# Patient Record
Sex: MALE | Race: WHITE
[De-identification: names, ages, dates, MRNs, and addresses within clinical notes are randomized per-mention and may not be internally consistent; named-entity substitution may affect disease eponyms.]

---

## 2017-01-10 ENCOUNTER — HOSPITAL ENCOUNTER (OUTPATIENT)
Dept: HOSPITAL 19 - MKS.ESL.PT | Age: 67
LOS: 42 days | Discharge: HOME | End: 2017-02-21
Payer: MEDICARE

## 2017-01-10 DIAGNOSIS — G62.89: Primary | ICD-10-CM

## 2017-01-31 ENCOUNTER — HOSPITAL ENCOUNTER (OUTPATIENT)
Dept: HOSPITAL 19 - MKS.ESL.PT | Age: 67
LOS: 5 days | Discharge: HOME | End: 2017-02-05
Payer: MEDICARE

## 2017-01-31 DIAGNOSIS — G62.89: Primary | ICD-10-CM

## 2018-02-23 ENCOUNTER — HOSPITAL ENCOUNTER (OUTPATIENT)
Dept: HOSPITAL 6 - LAB | Age: 68
End: 2018-02-23
Attending: INTERNAL MEDICINE
Payer: MEDICARE

## 2018-02-23 DIAGNOSIS — C67.9: ICD-10-CM

## 2018-02-23 DIAGNOSIS — G47.33: ICD-10-CM

## 2018-02-23 DIAGNOSIS — I48.0: Primary | ICD-10-CM

## 2018-02-23 DIAGNOSIS — E78.2: ICD-10-CM

## 2018-02-23 DIAGNOSIS — Z12.11: ICD-10-CM

## 2018-02-23 DIAGNOSIS — M1A.9XX0: ICD-10-CM

## 2018-02-23 DIAGNOSIS — N52.03: ICD-10-CM

## 2018-02-23 LAB
ALBUMIN SERPL-MCNC: 4.3 G/DL (ref 3.5–5)
ALT SERPL-CCNC: 30 U/L (ref 21–72)
AST SERPL-CCNC: 33 U/L (ref 17–59)
BASOPHILS # BLD: 0 10*3/UL (ref 0.02–0.1)
BILIRUB SERPL-MCNC: 0.8 MG/DL (ref 0.2–1.3)
BUN/CREAT SERPL: 15.7 (ref 6–26)
CALCIUM SERPL-MCNC: 9.8 MG/DL (ref 8.4–10.2)
CO2 SERPL-SCNC: 28 MMOL/L (ref 22–30)
EOSINOPHIL # BLD: 0.1 10*3/UL (ref 0.04–0.4)
EOSINOPHIL NFR BLD: 0.7 % (ref 0–4)
ERYTHROCYTE [DISTWIDTH] IN BLOOD BY AUTOMATED COUNT: 14 % (ref 11.5–14.5)
ERYTHROCYTE [SEDIMENTATION RATE] IN BLOOD: 6 MM/HR (ref 0–20)
GLUCOSE SERPL-MCNC: 103 MG/DL (ref 75–110)
HCT VFR BLD AUTO: 47.5 % (ref 42–52)
HGB BLD-MCNC: 16.6 G/DL (ref 13.5–18)
LYMPHOCYTES # BLD: 0.9 10*3/UL (ref 1.5–4)
MCH RBC QN AUTO: 30 PG (ref 27–31)
MCHC RBC AUTO-ENTMCNC: 35 G/DL (ref 33–37)
MCV RBC AUTO: 85 FL (ref 78–100)
MONOCYTES # BLD: 0.8 10*3/UL (ref 0.2–0.8)
NEUTROPHILS # BLD: 6.4 10*3/UL (ref 1.4–6.5)
PLATELET # BLD AUTO: 166 K/MM3 (ref 130–400)
PMV BLD AUTO: 10.9 FL (ref 7.4–10.4)
POTASSIUM SERPL-SCNC: 4.1 MMOL/L (ref 3.6–5)
PROT SERPL-MCNC: 7.7 G/DL (ref 6.3–8.2)
RBC # BLD AUTO: 5.56 M/MM3 (ref 4.2–5.6)
SODIUM SERPL-SCNC: 135 MMOL/L (ref 137–145)
WBC # BLD AUTO: 8.2 K/MM3 (ref 4.8–10.8)

## 2018-05-29 ENCOUNTER — HOSPITAL ENCOUNTER (OUTPATIENT)
Dept: HOSPITAL 6 - AMSURD | Age: 68
End: 2018-05-29
Attending: INTERNAL MEDICINE
Payer: MEDICARE

## 2018-05-29 ENCOUNTER — HOSPITAL ENCOUNTER (OUTPATIENT)
Dept: HOSPITAL 19 - ZLAB.WCH | Age: 68
End: 2018-05-29

## 2018-05-29 VITALS
DIASTOLIC BLOOD PRESSURE: 94 MMHG | SYSTOLIC BLOOD PRESSURE: 114 MMHG | DIASTOLIC BLOOD PRESSURE: 94 MMHG | DIASTOLIC BLOOD PRESSURE: 94 MMHG | SYSTOLIC BLOOD PRESSURE: 114 MMHG | DIASTOLIC BLOOD PRESSURE: 94 MMHG | SYSTOLIC BLOOD PRESSURE: 114 MMHG | DIASTOLIC BLOOD PRESSURE: 94 MMHG | DIASTOLIC BLOOD PRESSURE: 94 MMHG | DIASTOLIC BLOOD PRESSURE: 94 MMHG | SYSTOLIC BLOOD PRESSURE: 114 MMHG | SYSTOLIC BLOOD PRESSURE: 114 MMHG | SYSTOLIC BLOOD PRESSURE: 114 MMHG | SYSTOLIC BLOOD PRESSURE: 114 MMHG

## 2018-05-29 VITALS — BODY MASS INDEX: 31.85 KG/M2 | HEIGHT: 70.98 IN | WEIGHT: 227.52 LBS

## 2018-05-29 DIAGNOSIS — Z01.818: Primary | ICD-10-CM

## 2018-05-29 DIAGNOSIS — Z95.2: ICD-10-CM

## 2018-05-29 DIAGNOSIS — Z98.890: ICD-10-CM

## 2018-05-29 DIAGNOSIS — M17.0: ICD-10-CM

## 2018-05-29 DIAGNOSIS — Z01.89: Primary | ICD-10-CM

## 2018-05-29 LAB
ALBUMIN SERPL-MCNC: 4.1 G/DL (ref 3.5–5)
ALT SERPL-CCNC: 35 U/L (ref 21–72)
APPEARANCE UR: CLEAR
AST SERPL-CCNC: 51 U/L (ref 17–59)
BILIRUB SERPL-MCNC: 0.8 MG/DL (ref 0.2–1.3)
BILIRUB UR QL STRIP.AUTO: NEGATIVE
BUN/CREAT SERPL: 19 (ref 6–26)
CALCIUM SERPL-MCNC: 9.6 MG/DL (ref 8.4–10.2)
CO2 SERPL-SCNC: 30 MMOL/L (ref 22–30)
COLOR UR AUTO: YELLOW
ERYTHROCYTE [DISTWIDTH] IN BLOOD BY AUTOMATED COUNT: 14.8 % (ref 11.5–14.5)
GLUCOSE SERPL-MCNC: 105 MG/DL (ref 75–110)
GLUCOSE UR QL STRIP.AUTO: NEGATIVE MG/DL
HCT VFR BLD AUTO: 44.3 % (ref 42–52)
HGB BLD-MCNC: 15.4 G/DL (ref 13.5–18)
KETONES UR QL STRIP.AUTO: NEGATIVE
LEUKOCYTE ESTERASE UR QL STRIP: NEGATIVE
LYMPHOCYTES NFR BLD MANUAL: 14 % (ref 20–51)
MCH RBC QN AUTO: 30 PG (ref 27–31)
MCHC RBC AUTO-ENTMCNC: 35 G/DL (ref 33–37)
MCV RBC AUTO: 86 FL (ref 78–100)
MONOCYTES NFR BLD: 5 % (ref 3–10)
NEUTS SEG NFR BLD MANUAL: 80 % (ref 42–75)
NITRITE UR QL STRIP: NEGATIVE
PH UR STRIP.AUTO: 6.5 [PH] (ref 5–8)
PLATELET # BLD AUTO: 145 K/MM3 (ref 130–400)
PMV BLD AUTO: 10.9 FL (ref 7.4–10.4)
POTASSIUM SERPL-SCNC: 4.5 MMOL/L (ref 3.6–5)
PROT ?TM UR-MCNC: NEGATIVE MG/DL
PROT SERPL-MCNC: 7.5 G/DL (ref 6.3–8.2)
PROTHROMBIN TIME: 22.9 SECONDS (ref 9–12)
RBC # BLD AUTO: 5.16 M/MM3 (ref 4.2–5.6)
RBC UR QL AUTO: NEGATIVE
SODIUM SERPL-SCNC: 134 MMOL/L (ref 137–145)
SP GR UR STRIP.AUTO: 1.01 (ref 1–1.03)
UROBILINOGEN UR-MCNC: NORMAL MG/DL
WBC # BLD AUTO: 7.9 K/MM3 (ref 4.8–10.8)
WBC #/AREA URNS HPF: (no result) /HPF (ref 0–3)

## 2018-06-18 VITALS — SYSTOLIC BLOOD PRESSURE: 188 MMHG | HEART RATE: 49 BPM | DIASTOLIC BLOOD PRESSURE: 72 MMHG | TEMPERATURE: 97 F

## 2018-06-19 ENCOUNTER — HOSPITAL ENCOUNTER (OUTPATIENT)
Dept: HOSPITAL 19 - COL.ER | Age: 68
Setting detail: OBSERVATION
LOS: 1 days | Discharge: HOME | End: 2018-06-20
Attending: SURGERY | Admitting: SURGERY
Payer: MEDICARE

## 2018-06-19 VITALS — HEART RATE: 50 BPM | DIASTOLIC BLOOD PRESSURE: 74 MMHG | SYSTOLIC BLOOD PRESSURE: 188 MMHG

## 2018-06-19 VITALS — HEIGHT: 72 IN | BODY MASS INDEX: 30.94 KG/M2 | WEIGHT: 228.4 LBS

## 2018-06-19 VITALS — SYSTOLIC BLOOD PRESSURE: 181 MMHG | HEART RATE: 52 BPM | DIASTOLIC BLOOD PRESSURE: 71 MMHG

## 2018-06-19 VITALS — HEART RATE: 50 BPM | SYSTOLIC BLOOD PRESSURE: 177 MMHG | DIASTOLIC BLOOD PRESSURE: 69 MMHG

## 2018-06-19 VITALS — DIASTOLIC BLOOD PRESSURE: 84 MMHG | HEART RATE: 50 BPM | SYSTOLIC BLOOD PRESSURE: 205 MMHG

## 2018-06-19 VITALS — DIASTOLIC BLOOD PRESSURE: 64 MMHG | HEART RATE: 49 BPM | SYSTOLIC BLOOD PRESSURE: 157 MMHG

## 2018-06-19 DIAGNOSIS — I10: ICD-10-CM

## 2018-06-19 DIAGNOSIS — Z95.2: ICD-10-CM

## 2018-06-19 DIAGNOSIS — Z95.810: ICD-10-CM

## 2018-06-19 DIAGNOSIS — E78.5: ICD-10-CM

## 2018-06-19 DIAGNOSIS — Z90.5: ICD-10-CM

## 2018-06-19 DIAGNOSIS — T18.5XXA: Primary | ICD-10-CM

## 2018-06-19 DIAGNOSIS — Z88.6: ICD-10-CM

## 2018-06-19 DIAGNOSIS — Z87.891: ICD-10-CM

## 2018-06-19 LAB
ALBUMIN SERPL-MCNC: 4 GM/DL (ref 3.5–5)
ALP SERPL-CCNC: 64 U/L (ref 50–136)
ALT SERPL-CCNC: 35 U/L (ref 21–72)
ANION GAP SERPL CALC-SCNC: 11 MMOL/L (ref 7–16)
AST SERPL-CCNC: 37 U/L (ref 15–37)
BASOPHILS # BLD: 0 10*3/UL (ref 0–0.2)
BASOPHILS NFR BLD AUTO: 0.3 % (ref 0–2)
BILIRUB SERPL-MCNC: 1 MG/DL (ref 0–1)
BUN SERPL-MCNC: 23 MG/DL (ref 9–20)
CALCIUM SERPL-MCNC: 9.9 MG/DL (ref 8.4–10.2)
CHLORIDE SERPL-SCNC: 96 MMOL/L (ref 98–107)
CO2 SERPL-SCNC: 22 MMOL/L (ref 22–30)
CREAT SERPL-SCNC: 0.98 MG/DL (ref 0.66–1.25)
EOSINOPHIL # BLD: 0 10*3/UL (ref 0–0.7)
EOSINOPHIL NFR BLD: 0 % (ref 0–4)
ERYTHROCYTE [DISTWIDTH] IN BLOOD BY AUTOMATED COUNT: 14.9 % (ref 11.5–14.5)
GLUCOSE SERPL-MCNC: 116 MG/DL (ref 74–106)
GRANULOCYTES # BLD AUTO: 90 % (ref 42.2–75.2)
HCT VFR BLD AUTO: 42.3 % (ref 42–52)
HGB BLD-MCNC: 15.1 G/DL (ref 13.5–18)
INR BLD: 2.5 (ref 0.8–3)
LYMPHOCYTES # BLD: 0.5 10*3/UL (ref 1.2–3.4)
LYMPHOCYTES NFR BLD: 4.3 % (ref 20–51)
MCH RBC QN AUTO: 31 PG (ref 27–31)
MCHC RBC AUTO-ENTMCNC: 36 G/DL (ref 33–37)
MCV RBC AUTO: 86 FL (ref 80–100)
MONOCYTES # BLD: 0.5 10*3/UL (ref 0.1–0.6)
MONOCYTES NFR BLD AUTO: 4.8 % (ref 1.7–9.3)
NEUTROPHILS # BLD: 10.2 10*3/UL (ref 1.4–6.5)
PLATELET # BLD AUTO: 165 K/MM3 (ref 130–400)
PMV BLD AUTO: 10.5 FL (ref 7.4–10.4)
POTASSIUM SERPL-SCNC: 4 MMOL/L (ref 3.4–5)
PROT SERPL-MCNC: 7.5 GM/DL (ref 6.4–8.2)
PROTHROMBIN TIME: 28.4 SECONDS (ref 9.7–12.8)
RBC # BLD AUTO: 4.94 M/MM3 (ref 4.2–5.6)
SODIUM SERPL-SCNC: 129 MMOL/L (ref 137–145)

## 2018-06-19 PROCEDURE — G0378 HOSPITAL OBSERVATION PER HR: HCPCS

## 2018-06-19 PROCEDURE — A4314 CATH W/DRAINAGE 2-WAY LATEX: HCPCS

## 2018-06-20 VITALS — TEMPERATURE: 98.4 F | DIASTOLIC BLOOD PRESSURE: 67 MMHG | HEART RATE: 54 BPM | SYSTOLIC BLOOD PRESSURE: 168 MMHG

## 2018-06-20 VITALS — HEART RATE: 79 BPM | SYSTOLIC BLOOD PRESSURE: 133 MMHG | DIASTOLIC BLOOD PRESSURE: 62 MMHG

## 2018-06-20 VITALS — HEART RATE: 65 BPM | DIASTOLIC BLOOD PRESSURE: 60 MMHG | SYSTOLIC BLOOD PRESSURE: 153 MMHG | TEMPERATURE: 98 F

## 2018-06-20 VITALS — HEART RATE: 55 BPM | SYSTOLIC BLOOD PRESSURE: 172 MMHG | DIASTOLIC BLOOD PRESSURE: 73 MMHG | TEMPERATURE: 97.3 F

## 2018-06-20 VITALS — SYSTOLIC BLOOD PRESSURE: 181 MMHG | DIASTOLIC BLOOD PRESSURE: 70 MMHG | TEMPERATURE: 98.4 F | HEART RATE: 52 BPM

## 2018-06-20 VITALS — DIASTOLIC BLOOD PRESSURE: 62 MMHG | SYSTOLIC BLOOD PRESSURE: 138 MMHG | HEART RATE: 49 BPM | TEMPERATURE: 97 F

## 2018-06-20 VITALS — HEART RATE: 49 BPM | DIASTOLIC BLOOD PRESSURE: 61 MMHG | TEMPERATURE: 97 F | SYSTOLIC BLOOD PRESSURE: 144 MMHG

## 2018-06-20 VITALS — SYSTOLIC BLOOD PRESSURE: 151 MMHG | TEMPERATURE: 97.4 F | HEART RATE: 50 BPM | DIASTOLIC BLOOD PRESSURE: 64 MMHG

## 2018-06-20 LAB
ALBUMIN SERPL-MCNC: 3.8 GM/DL (ref 3.5–5)
ANION GAP SERPL CALC-SCNC: 9 MMOL/L (ref 7–16)
BASOPHILS # BLD: 0 10*3/UL (ref 0–0.2)
BASOPHILS NFR BLD AUTO: 0.2 % (ref 0–2)
BUN SERPL-MCNC: 19 MG/DL (ref 9–20)
CALCIUM SERPL-MCNC: 9.3 MG/DL (ref 8.4–10.2)
CHLORIDE SERPL-SCNC: 96 MMOL/L (ref 98–107)
CO2 SERPL-SCNC: 26 MMOL/L (ref 22–30)
CREAT SERPL-SCNC: 0.89 MG/DL (ref 0.66–1.25)
EOSINOPHIL # BLD: 0 10*3/UL (ref 0–0.7)
EOSINOPHIL NFR BLD: 0.3 % (ref 0–4)
ERYTHROCYTE [DISTWIDTH] IN BLOOD BY AUTOMATED COUNT: 15.6 % (ref 11.5–14.5)
GLUCOSE SERPL-MCNC: 90 MG/DL (ref 74–106)
GRANULOCYTES # BLD AUTO: 86.2 % (ref 42.2–75.2)
HCT VFR BLD AUTO: 43.7 % (ref 42–52)
HGB BLD-MCNC: 15.3 G/DL (ref 13.5–18)
INR BLD: 2.1 (ref 0.8–3)
LYMPHOCYTES # BLD: 0.5 10*3/UL (ref 1.2–3.4)
LYMPHOCYTES NFR BLD: 5.3 % (ref 20–51)
MCH RBC QN AUTO: 30 PG (ref 27–31)
MCHC RBC AUTO-ENTMCNC: 35 G/DL (ref 33–37)
MCV RBC AUTO: 86 FL (ref 80–100)
MONOCYTES # BLD: 0.7 10*3/UL (ref 0.1–0.6)
MONOCYTES NFR BLD AUTO: 7.3 % (ref 1.7–9.3)
NEUTROPHILS # BLD: 8.5 10*3/UL (ref 1.4–6.5)
PHOSPHATE SERPL-MCNC: 3.7 MG/DL (ref 2.5–4.5)
PLATELET # BLD AUTO: 157 K/MM3 (ref 130–400)
PMV BLD AUTO: 11.3 FL (ref 7.4–10.4)
POTASSIUM SERPL-SCNC: 3.9 MMOL/L (ref 3.4–5)
PROTHROMBIN TIME: 24.2 SECONDS (ref 9.7–12.8)
RBC # BLD AUTO: 5.06 M/MM3 (ref 4.2–5.6)
SODIUM SERPL-SCNC: 131 MMOL/L (ref 137–145)

## 2019-01-08 ENCOUNTER — HOSPITAL ENCOUNTER (OUTPATIENT)
Dept: HOSPITAL 6 - LAB | Age: 69
End: 2019-01-08
Attending: INTERNAL MEDICINE
Payer: MEDICARE

## 2019-01-08 ENCOUNTER — HOSPITAL ENCOUNTER (OUTPATIENT)
Dept: HOSPITAL 19 - ZLAB.WCH | Age: 69
End: 2019-01-08

## 2019-01-08 DIAGNOSIS — E29.1: ICD-10-CM

## 2019-01-08 DIAGNOSIS — E78.2: ICD-10-CM

## 2019-01-08 DIAGNOSIS — Z01.89: Primary | ICD-10-CM

## 2019-01-08 DIAGNOSIS — M1A.9XX0: ICD-10-CM

## 2019-01-08 DIAGNOSIS — I48.92: ICD-10-CM

## 2019-01-08 DIAGNOSIS — I48.91: Primary | ICD-10-CM

## 2019-01-08 DIAGNOSIS — I10: ICD-10-CM

## 2019-01-08 DIAGNOSIS — G62.9: ICD-10-CM

## 2019-01-08 LAB
ALBUMIN SERPL-MCNC: 4.6 G/DL (ref 3.5–5)
ALT SERPL-CCNC: 23 U/L (ref 21–72)
AST SERPL-CCNC: 36 U/L (ref 17–59)
BILIRUB SERPL-MCNC: 1 MG/DL (ref 0.2–1.3)
CALCIUM SERPL-MCNC: 10.4 MG/DL (ref 8.4–10.2)
CO2 SERPL-SCNC: 30 MMOL/L (ref 22–30)
ERYTHROCYTE [DISTWIDTH] IN BLOOD BY AUTOMATED COUNT: 14.4 % (ref 11.5–14.5)
ERYTHROCYTE [SEDIMENTATION RATE] IN BLOOD: 7 MM/HR (ref 0–20)
GLUCOSE SERPL-MCNC: 89 MG/DL (ref 75–110)
HCT VFR BLD AUTO: 46.6 % (ref 42–52)
HGB BLD-MCNC: 16 G/DL (ref 13.5–18)
LYMPHOCYTES NFR BLD MANUAL: 11 % (ref 20–51)
MCH RBC QN AUTO: 30 PG (ref 27–31)
MCHC RBC AUTO-ENTMCNC: 34 G/DL (ref 33–37)
MCV RBC AUTO: 88 FL (ref 78–100)
MONOCYTES NFR BLD: 12 % (ref 3–10)
NEUTS SEG NFR BLD MANUAL: 76 % (ref 42–75)
PLATELET # BLD AUTO: 145 K/MM3 (ref 130–400)
PMV BLD AUTO: 10.5 FL (ref 7.4–10.4)
POTASSIUM SERPL-SCNC: 5 MMOL/L (ref 3.6–5)
PROT SERPL-MCNC: 8 G/DL (ref 6.3–8.2)
RBC # BLD AUTO: 5.27 M/MM3 (ref 4.2–5.6)
SODIUM SERPL-SCNC: 133 MMOL/L (ref 137–145)
WBC # BLD AUTO: 7.3 K/MM3 (ref 4.8–10.8)

## 2019-01-09 LAB — TESTOST SERPL-MCNC: 501 NG/DL (ref 221–716)

## 2019-06-04 ENCOUNTER — HOSPITAL ENCOUNTER (OUTPATIENT)
Dept: HOSPITAL 6 - LAB | Age: 69
End: 2019-06-04
Attending: INTERNAL MEDICINE
Payer: MEDICARE

## 2019-06-04 DIAGNOSIS — G62.9: ICD-10-CM

## 2019-06-04 DIAGNOSIS — I48.91: ICD-10-CM

## 2019-06-04 DIAGNOSIS — E78.2: ICD-10-CM

## 2019-06-04 DIAGNOSIS — Z95.2: ICD-10-CM

## 2019-06-04 DIAGNOSIS — I10: Primary | ICD-10-CM

## 2019-06-04 LAB
ALBUMIN SERPL-MCNC: 4.2 G/DL (ref 3.4–4.8)
ALT SERPL-CCNC: 19 U/L (ref 0–55)
AST SERPL-CCNC: 29 U/L (ref 5–34)
BASOPHILS # BLD: 0 10*3/UL (ref 0.02–0.1)
BILIRUB SERPL-MCNC: 0.8 MG/DL (ref 0.2–1.2)
CALCIUM SERPL-MCNC: 10.7 MG/DL (ref 8.3–10.5)
CO2 SERPL-SCNC: 24 MMOL/L (ref 23–31)
EOSINOPHIL # BLD: 0.1 10*3/UL (ref 0.04–0.4)
EOSINOPHIL NFR BLD: 1.2 % (ref 0–4)
ERYTHROCYTE [DISTWIDTH] IN BLOOD BY AUTOMATED COUNT: 15.2 % (ref 11.5–14.5)
ERYTHROCYTE [SEDIMENTATION RATE] IN BLOOD: 29 MM/HR (ref 0–20)
GLUCOSE SERPL-MCNC: 99 MG/DL (ref 75–110)
HCT VFR BLD AUTO: 45.7 % (ref 42–52)
HGB BLD-MCNC: 15.2 G/DL (ref 13.5–18)
LYMPHOCYTES # BLD: 1 10*3/UL (ref 1.5–4)
MCH RBC QN AUTO: 30 PG (ref 27–31)
MCHC RBC AUTO-ENTMCNC: 33 G/DL (ref 33–37)
MCV RBC AUTO: 90 FL (ref 78–100)
MONOCYTES # BLD: 0.8 10*3/UL (ref 0.2–0.8)
NEUTROPHILS # BLD: 6.3 10*3/UL (ref 1.4–6.5)
PLATELET # BLD AUTO: 161 K/MM3 (ref 130–400)
PMV BLD AUTO: 11.1 FL (ref 7.4–10.4)
POTASSIUM SERPL-SCNC: 4.9 MMOL/L (ref 3.5–5.1)
PROT SERPL-MCNC: 7.6 G/DL (ref 6.2–8.1)
PROTHROMBIN TIME: 21.7 SECONDS (ref 9–12)
RBC # BLD AUTO: 5.08 M/MM3 (ref 4.2–5.6)
SODIUM SERPL-SCNC: 135 MMOL/L (ref 136–145)
WBC # BLD AUTO: 8.3 K/MM3 (ref 4.8–10.8)

## 2019-06-08 LAB — TESTOST SERPL-MCNC: 500 NG/DL (ref 221–716)

## 2019-12-03 ENCOUNTER — HOSPITAL ENCOUNTER (OUTPATIENT)
Dept: HOSPITAL 6 - LAB | Age: 69
End: 2019-12-03
Attending: INTERNAL MEDICINE
Payer: MEDICARE

## 2019-12-03 DIAGNOSIS — I48.0: ICD-10-CM

## 2019-12-03 DIAGNOSIS — E78.2: ICD-10-CM

## 2019-12-03 DIAGNOSIS — I10: Primary | ICD-10-CM

## 2019-12-03 DIAGNOSIS — Z95.2: ICD-10-CM

## 2019-12-03 DIAGNOSIS — M1A.9XX0: ICD-10-CM

## 2019-12-03 DIAGNOSIS — G62.89: ICD-10-CM

## 2019-12-03 LAB
ALBUMIN SERPL-MCNC: 4.1 G/DL (ref 3.4–4.8)
ALT SERPL-CCNC: 18 U/L (ref 0–55)
AST SERPL-CCNC: 24 U/L (ref 5–34)
BILIRUB SERPL-MCNC: 0.8 MG/DL (ref 0.2–1.2)
CALCIUM SERPL-MCNC: 10.4 MG/DL (ref 8.3–10.5)
CO2 SERPL-SCNC: 25 MMOL/L (ref 23–31)
ERYTHROCYTE [DISTWIDTH] IN BLOOD BY AUTOMATED COUNT: 14.3 % (ref 11.5–14.5)
ERYTHROCYTE [SEDIMENTATION RATE] IN BLOOD: 16 MM/HR (ref 0–20)
GLUCOSE SERPL-MCNC: 97 MG/DL (ref 75–110)
HCT VFR BLD AUTO: 45.4 % (ref 42–52)
HGB BLD-MCNC: 14.6 G/DL (ref 13.5–18)
LYMPHOCYTES NFR BLD MANUAL: 8 % (ref 20–51)
MAGNESIUM SERPL-MCNC: 1.87 MG/DL (ref 1.6–2.6)
MCH RBC QN AUTO: 29 PG (ref 27–31)
MCHC RBC AUTO-ENTMCNC: 32 G/DL (ref 33–37)
MCV RBC AUTO: 90 FL (ref 78–100)
MONOCYTES NFR BLD: 8 % (ref 3–10)
NEUTS SEG NFR BLD MANUAL: 83 % (ref 42–75)
NRBC BLD AUTO-RTO: 1 % (ref 0–6)
PLATELET # BLD AUTO: 172 K/MM3 (ref 130–400)
PMV BLD AUTO: 11.1 FL (ref 7.4–10.4)
POTASSIUM SERPL-SCNC: 5.2 MMOL/L (ref 3.5–5.1)
PROT SERPL-MCNC: 7.4 G/DL (ref 6.2–8.1)
PROTHROMBIN TIME: 28.3 SECONDS (ref 9–12)
RBC # BLD AUTO: 5.07 M/MM3 (ref 4.2–5.6)
SODIUM SERPL-SCNC: 136 MMOL/L (ref 136–145)
WBC # BLD AUTO: 8.8 K/MM3 (ref 4.8–10.8)

## 2020-05-17 ENCOUNTER — HOSPITAL ENCOUNTER (INPATIENT)
Dept: HOSPITAL 19 - COL.ER | Age: 70
LOS: 3 days | Discharge: HOME | DRG: 200 | End: 2020-05-20
Attending: SURGERY | Admitting: SURGERY
Payer: MEDICARE

## 2020-05-17 VITALS — SYSTOLIC BLOOD PRESSURE: 135 MMHG | DIASTOLIC BLOOD PRESSURE: 63 MMHG | HEART RATE: 50 BPM

## 2020-05-17 VITALS — DIASTOLIC BLOOD PRESSURE: 63 MMHG | HEART RATE: 50 BPM | TEMPERATURE: 97.8 F | SYSTOLIC BLOOD PRESSURE: 135 MMHG

## 2020-05-17 VITALS — HEART RATE: 51 BPM | SYSTOLIC BLOOD PRESSURE: 149 MMHG | TEMPERATURE: 97.4 F | DIASTOLIC BLOOD PRESSURE: 62 MMHG

## 2020-05-17 VITALS — HEART RATE: 51 BPM | DIASTOLIC BLOOD PRESSURE: 62 MMHG | SYSTOLIC BLOOD PRESSURE: 149 MMHG

## 2020-05-17 VITALS — TEMPERATURE: 97.2 F | SYSTOLIC BLOOD PRESSURE: 147 MMHG | DIASTOLIC BLOOD PRESSURE: 59 MMHG | HEART RATE: 50 BPM

## 2020-05-17 VITALS — HEIGHT: 72.99 IN | BODY MASS INDEX: 30.56 KG/M2 | WEIGHT: 230.6 LBS

## 2020-05-17 DIAGNOSIS — I10: ICD-10-CM

## 2020-05-17 DIAGNOSIS — S27.0XXA: Primary | ICD-10-CM

## 2020-05-17 DIAGNOSIS — Z95.2: ICD-10-CM

## 2020-05-17 DIAGNOSIS — Z90.5: ICD-10-CM

## 2020-05-17 DIAGNOSIS — Z95.810: ICD-10-CM
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
## 2020-05-17 DIAGNOSIS — Z79.01: ICD-10-CM

## 2020-05-17 DIAGNOSIS — G62.9: ICD-10-CM

## 2020-05-17 DIAGNOSIS — S22.31XA: ICD-10-CM

## 2020-05-17 DIAGNOSIS — E78.5: ICD-10-CM
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
## 2020-05-17 DIAGNOSIS — Z87.891: ICD-10-CM

## 2020-05-17 LAB
ALBUMIN SERPL-MCNC: 4.7 GM/DL (ref 3.5–5)
ALP SERPL-CCNC: 74 U/L (ref 50–136)
ALT SERPL-CCNC: 21 U/L (ref 4–49)
ANION GAP SERPL CALC-SCNC: 8 MMOL/L (ref 7–16)
APTT PPP: 48.8 SECONDS (ref 26–37)
AST SERPL-CCNC: 37 U/L (ref 15–37)
BASOPHILS # BLD: 0 10*3/UL (ref 0–0.2)
BASOPHILS NFR BLD AUTO: 0.4 % (ref 0–2)
BILIRUB SERPL-MCNC: 0.9 MG/DL (ref 0–1)
BUN SERPL-MCNC: 26 MG/DL (ref 9–20)
CALCIUM SERPL-MCNC: 10.1 MG/DL (ref 8.4–10.2)
CHLORIDE SERPL-SCNC: 102 MMOL/L (ref 98–107)
CO2 SERPL-SCNC: 25 MMOL/L (ref 22–30)
CREAT SERPL-SCNC: 1.74 UMOL/L (ref 0.66–1.25)
EOSINOPHIL # BLD: 0.1 10*3/UL (ref 0–0.7)
EOSINOPHIL NFR BLD: 1.1 % (ref 0–4)
ERYTHROCYTE [DISTWIDTH] IN BLOOD BY AUTOMATED COUNT: 14.4 % (ref 11.5–14.5)
GLUCOSE SERPL-MCNC: 119 MG/DL (ref 74–106)
GRANULOCYTES # BLD AUTO: 80.6 % (ref 42.2–75.2)
HCT VFR BLD AUTO: 47.4 % (ref 42–52)
HCT VFR BLD AUTO: 47.9 % (ref 42–52)
HGB BLD-MCNC: 16 G/DL (ref 13.5–18)
HGB BLD-MCNC: 16.1 G/DL (ref 13.5–18)
INR BLD: 2.4 (ref 0.8–3)
LYMPHOCYTES # BLD: 1 10*3/UL (ref 1.2–3.4)
LYMPHOCYTES NFR BLD: 10.3 % (ref 20–51)
MCH RBC QN AUTO: 29 PG (ref 27–31)
MCHC RBC AUTO-ENTMCNC: 34 G/DL (ref 33–37)
MCV RBC AUTO: 88 FL (ref 80–100)
MONOCYTES # BLD: 0.6 10*3/UL (ref 0.1–0.6)
MONOCYTES NFR BLD AUTO: 6.4 % (ref 1.7–9.3)
NEUTROPHILS # BLD: 8 10*3/UL (ref 1.4–6.5)
PLATELET # BLD AUTO: 157 K/MM3 (ref 130–400)
PMV BLD AUTO: 11.3 FL (ref 7.4–10.4)
POTASSIUM SERPL-SCNC: 4.9 MMOL/L (ref 3.4–5)
PROT SERPL-MCNC: 8.4 GM/DL (ref 6.4–8.2)
PROTHROMBIN TIME: 27.3 SECONDS (ref 9.7–12.8)
RBC # BLD AUTO: 5.47 M/MM3 (ref 4.2–5.6)
SODIUM SERPL-SCNC: 135 MMOL/L (ref 137–145)

## 2020-05-17 PROCEDURE — A9284 NON-ELECTRONIC SPIROMETER: HCPCS

## 2020-05-17 PROCEDURE — 0W9930Z DRAINAGE OF RIGHT PLEURAL CAVITY WITH DRAINAGE DEVICE, PERCUTANEOUS APPROACH: ICD-10-PCS | Performed by: SURGERY

## 2020-05-17 NOTE — NUR
Report received. Assumed care for night shift. Assessment complete. A&Ox3.
Chest tube-right chest-to 20cm suction. Small amount of reddish fluid. IV to
left AC with [id number]-dilaudid PCA for pain. Rating pain 1/10 to right
shoulder/ribs-states dilaudid is controlling pain well. O2@4L/NC. Denies
questions/concerns. Call light in reach. Will monitor.

## 2020-05-17 NOTE — NUR
Received patient from ED. Alert. States pain in right ribs and right shoulder
is better. Right upper chest tube to 20 cm continous suction draining small
amounts bloody drainage. Denies shortness of breath. O2 on per oxymask.

## 2020-05-18 VITALS — TEMPERATURE: 97.9 F | SYSTOLIC BLOOD PRESSURE: 151 MMHG | HEART RATE: 50 BPM | DIASTOLIC BLOOD PRESSURE: 59 MMHG

## 2020-05-18 VITALS — HEART RATE: 50 BPM | SYSTOLIC BLOOD PRESSURE: 151 MMHG | DIASTOLIC BLOOD PRESSURE: 59 MMHG

## 2020-05-18 VITALS — DIASTOLIC BLOOD PRESSURE: 57 MMHG | HEART RATE: 50 BPM | SYSTOLIC BLOOD PRESSURE: 148 MMHG

## 2020-05-18 VITALS — TEMPERATURE: 98.2 F | SYSTOLIC BLOOD PRESSURE: 148 MMHG | DIASTOLIC BLOOD PRESSURE: 57 MMHG | HEART RATE: 50 BPM

## 2020-05-18 VITALS — SYSTOLIC BLOOD PRESSURE: 153 MMHG | HEART RATE: 50 BPM | TEMPERATURE: 97.4 F | DIASTOLIC BLOOD PRESSURE: 58 MMHG

## 2020-05-18 VITALS — DIASTOLIC BLOOD PRESSURE: 61 MMHG | HEART RATE: 51 BPM | SYSTOLIC BLOOD PRESSURE: 155 MMHG | TEMPERATURE: 97.6 F

## 2020-05-18 VITALS — SYSTOLIC BLOOD PRESSURE: 154 MMHG | HEART RATE: 87 BPM | DIASTOLIC BLOOD PRESSURE: 78 MMHG

## 2020-05-18 VITALS — SYSTOLIC BLOOD PRESSURE: 151 MMHG | DIASTOLIC BLOOD PRESSURE: 63 MMHG | HEART RATE: 50 BPM

## 2020-05-18 VITALS — DIASTOLIC BLOOD PRESSURE: 72 MMHG | SYSTOLIC BLOOD PRESSURE: 152 MMHG | HEART RATE: 88 BPM

## 2020-05-18 VITALS — DIASTOLIC BLOOD PRESSURE: 63 MMHG | HEART RATE: 50 BPM | TEMPERATURE: 98.8 F | SYSTOLIC BLOOD PRESSURE: 151 MMHG

## 2020-05-18 LAB
ALBUMIN SERPL-MCNC: 4 GM/DL (ref 3.5–5)
ALP SERPL-CCNC: 63 U/L (ref 50–136)
ALT SERPL-CCNC: 19 U/L (ref 4–49)
ANION GAP SERPL CALC-SCNC: 7 MMOL/L (ref 7–16)
AST SERPL-CCNC: 37 U/L (ref 15–37)
BASOPHILS # BLD: 0 10*3/UL (ref 0–0.2)
BASOPHILS NFR BLD AUTO: 0.2 % (ref 0–2)
BILIRUB SERPL-MCNC: 0.9 MG/DL (ref 0–1)
BUN SERPL-MCNC: 19 MG/DL (ref 9–20)
CALCIUM SERPL-MCNC: 9 MG/DL (ref 8.4–10.2)
CHLORIDE SERPL-SCNC: 105 MMOL/L (ref 98–107)
CO2 SERPL-SCNC: 23 MMOL/L (ref 22–30)
CREAT SERPL-SCNC: 1.13 UMOL/L (ref 0.66–1.25)
EOSINOPHIL # BLD: 0.1 10*3/UL (ref 0–0.7)
EOSINOPHIL NFR BLD: 1.2 % (ref 0–4)
ERYTHROCYTE [DISTWIDTH] IN BLOOD BY AUTOMATED COUNT: 14.6 % (ref 11.5–14.5)
GLUCOSE SERPL-MCNC: 114 MG/DL (ref 74–106)
GRANULOCYTES # BLD AUTO: 82.1 % (ref 42.2–75.2)
HCT VFR BLD AUTO: 41.7 % (ref 42–52)
HGB BLD-MCNC: 14.1 G/DL (ref 13.5–18)
INR BLD: 2.6 (ref 0.8–3)
LYMPHOCYTES # BLD: 0.8 10*3/UL (ref 1.2–3.4)
LYMPHOCYTES NFR BLD: 7.4 % (ref 20–51)
MCH RBC QN AUTO: 30 PG (ref 27–31)
MCHC RBC AUTO-ENTMCNC: 34 G/DL (ref 33–37)
MCV RBC AUTO: 88 FL (ref 80–100)
MONOCYTES # BLD: 0.9 10*3/UL (ref 0.1–0.6)
MONOCYTES NFR BLD AUTO: 8.6 % (ref 1.7–9.3)
NEUTROPHILS # BLD: 8.3 10*3/UL (ref 1.4–6.5)
PLATELET # BLD AUTO: 147 K/MM3 (ref 130–400)
PMV BLD AUTO: 11.6 FL (ref 7.4–10.4)
POTASSIUM SERPL-SCNC: 4.6 MMOL/L (ref 3.4–5)
PROT SERPL-MCNC: 7.3 GM/DL (ref 6.4–8.2)
PROTHROMBIN TIME: 28.8 SECONDS (ref 9.7–12.8)
RBC # BLD AUTO: 4.75 M/MM3 (ref 4.2–5.6)
SODIUM SERPL-SCNC: 136 MMOL/L (ref 137–145)

## 2020-05-18 NOTE — NUR
SW met with the patient to discuss discharge plan. The patient lives outside
of [place] with his wife, Tess (ph#[phone]). He reports independence with
ADLs and has a cane, walker, and wheelchair available to him, if needed. The
patient's PCP is Dr. Bobo Blevins and he receives his medications at [facility]. He reports no difficulties obtaining his meds. The patient's
advanced directives are in EMR. His DPOA-HC is his wife. The patient plans to
return home with his wife upon discharge. He is currently requiring four
liters of oxygen. SW to continue to monitor.

## 2020-05-18 NOTE — NUR
Patient alert and oriented, answers questions appropriately.  See assessment.
Chest tube to right upper chest with dressing CDI.  Chest tube patent to LCS.
45ml serosanguinous fluid noted in chest tube.  Clamps, sterile water, gauze
and vaseline gauze at bedside.  RUL decreased, all other lung fields CTA.  PCA
dilaudid infusing into LAC IV, PCA reviewed with patient.  IS reviewed with
patient.  Patient states he is unable to move BUE, ROM exercises reviewed with
patient, no restrictions for BUE.  Neuros intact to BUE, no numbness or
tingling, pulses palpable.  OOB encouraged.  No other c/o at this time.

## 2020-05-18 NOTE — NUR
Report received. Assumed care for night shift. Assessment complete. VS stable.
A&Ox3. Good pain control with dilaudid PCA. Right chest tube dressing
CDI-chest tube to continuous suction of 20cm. Output pink tinged. IV to left
AC infusing D5W@30ml/hr without difficulty. O2 at 4L/NC. Plan of care
discussed for this shift to include chest tube to water seal/chest x ray in
AM. Verbalizes understanding. Denies nausea/tolerating PO. SCDs bilat.
Encouraged to call with questions/concerns. Call light in reach. Will monitor.

## 2020-05-18 NOTE — NUR
Rested off and on this shift. Dilaudid PCA contolling pain. States he cant
sleep here and has been very restless due to the noise of the PCA/IV pump.
10mls out total to chest tube-serosanguineous-remains at low continuous
suction at 20cm. No c/o shortness of breath/nausea. Call light in reach. Will
monitor.

## 2020-05-19 VITALS — HEART RATE: 50 BPM | DIASTOLIC BLOOD PRESSURE: 46 MMHG | SYSTOLIC BLOOD PRESSURE: 125 MMHG

## 2020-05-19 VITALS — HEART RATE: 51 BPM | DIASTOLIC BLOOD PRESSURE: 70 MMHG | SYSTOLIC BLOOD PRESSURE: 142 MMHG

## 2020-05-19 VITALS — DIASTOLIC BLOOD PRESSURE: 70 MMHG | TEMPERATURE: 98 F | SYSTOLIC BLOOD PRESSURE: 142 MMHG | HEART RATE: 51 BPM

## 2020-05-19 VITALS — TEMPERATURE: 98.6 F | SYSTOLIC BLOOD PRESSURE: 125 MMHG | DIASTOLIC BLOOD PRESSURE: 46 MMHG | HEART RATE: 50 BPM

## 2020-05-19 VITALS — DIASTOLIC BLOOD PRESSURE: 55 MMHG | HEART RATE: 50 BPM | SYSTOLIC BLOOD PRESSURE: 141 MMHG | TEMPERATURE: 97.9 F

## 2020-05-19 VITALS — TEMPERATURE: 98.7 F | HEART RATE: 55 BPM | SYSTOLIC BLOOD PRESSURE: 127 MMHG | DIASTOLIC BLOOD PRESSURE: 53 MMHG

## 2020-05-19 VITALS — HEART RATE: 50 BPM | SYSTOLIC BLOOD PRESSURE: 127 MMHG | TEMPERATURE: 98.8 F | DIASTOLIC BLOOD PRESSURE: 48 MMHG

## 2020-05-19 VITALS — DIASTOLIC BLOOD PRESSURE: 51 MMHG | TEMPERATURE: 98.7 F | SYSTOLIC BLOOD PRESSURE: 140 MMHG | HEART RATE: 52 BPM

## 2020-05-19 LAB
ANION GAP SERPL CALC-SCNC: 8 MMOL/L (ref 7–16)
BUN SERPL-MCNC: 19 MG/DL (ref 9–20)
CALCIUM SERPL-MCNC: 9.1 MG/DL (ref 8.4–10.2)
CHLORIDE SERPL-SCNC: 99 MMOL/L (ref 98–107)
CO2 SERPL-SCNC: 22 MMOL/L (ref 22–30)
CREAT SERPL-SCNC: 1.12 UMOL/L (ref 0.66–1.25)
ERYTHROCYTE [DISTWIDTH] IN BLOOD BY AUTOMATED COUNT: 14.6 % (ref 11.5–14.5)
GLUCOSE SERPL-MCNC: 109 MG/DL (ref 74–106)
HCT VFR BLD AUTO: 42.1 % (ref 42–52)
HGB BLD-MCNC: 14.3 G/DL (ref 13.5–18)
INR BLD: 1.9 (ref 0.8–3)
MCH RBC QN AUTO: 30 PG (ref 27–31)
MCHC RBC AUTO-ENTMCNC: 34 G/DL (ref 33–37)
MCV RBC AUTO: 88 FL (ref 80–100)
PLATELET # BLD AUTO: 126 K/MM3 (ref 130–400)
PMV BLD AUTO: 11.9 FL (ref 7.4–10.4)
POTASSIUM SERPL-SCNC: 4.5 MMOL/L (ref 3.4–5)
PROTHROMBIN TIME: 21.4 SECONDS (ref 9.7–12.8)
RBC # BLD AUTO: 4.78 M/MM3 (ref 4.2–5.6)
SODIUM SERPL-SCNC: 129 MMOL/L (ref 137–145)

## 2020-05-19 NOTE — NUR
Patient got up this afternoon to use the bathroom. He was straining to hard to
have a bowel movement and started bleeding from his hemorrhoids. Dr Escobedo
notified. TUKs pads ordered. Patient stated the bleeding has since resolved.
He has had problems in the past with this. Pain stated the pain to his
shoulder is better this afternoon and he has been able to use it more. His
pain has been well controlled with pain pills. No other changes at this time.
Call light within reach.

## 2020-05-19 NOTE — NUR
Report received. Assumed care for night shift. Up in room doing oral care.
Assessment complete. VS stable. A&Ox3. Denies shortness of breath/nausea.
Rating pain to right side/shoulder 3/10 on pain scale-described as constant
ache. Denies need for intervention. Dressing to right chest with small dime
size old drainage-gauze dressing. INT to right and left AC flush without
difficulty. Tolerating PO. SCDs bilat. Denies questions/concerns. Call light
in reach. Will monitor.

## 2020-05-19 NOTE — NUR
Dr Escobedo came to see patient. He discontinued the chest tube. Will be
stopping the PCA and startign on PO pain medications. Patient started having
pain to his right shoulder in the night when he tried to get up to the
bathroom. X-ray of right shoulder ordered because patient can not use his
right arm or place weight on the right hand. Patient is sitting up in the
chair. He stated he is looking forward to getting the PCA discontinued.
Patient is also getting stool softners and laxatives. Tylenol and roxicodone
given for pain at this time. No other changes at this time. Call light within
reach.

## 2020-05-19 NOTE — NUR
This nurse heard moaning at nurses station. When got to room was trying to
stand up stating "I fell asleep for several hours and I am in so much pain I
have to stand up." Stood at bedside for approx 5 minutes. Crying out in pain.
Has not used PCA in several hours. Assisted back to bed. Bolus of 0.2mg given
per dr order for pain rated 10/10 to right chest/shoulder/back. Resting more
comfortably now. Denies needs. Call lightin reach. Will monitor.

## 2020-05-19 NOTE — NUR
Up to bathroom to void. Called for assistance sitting up. States he cant use
right shoulder very well and has a hard time going from bed to standing.
Minimal assistance given-able to return to bed with no assistance. Did
ambulate around room but stated he couldnt take the pain. Rating pain 8/10 on
pain scale to right chest/shoulder described as constant ache/throbbing.
Denies shorntess of breath. Roxicodone given per dr bear. Will monitor

## 2020-05-20 VITALS — DIASTOLIC BLOOD PRESSURE: 64 MMHG | SYSTOLIC BLOOD PRESSURE: 151 MMHG | HEART RATE: 56 BPM | TEMPERATURE: 97.8 F

## 2020-05-20 VITALS — SYSTOLIC BLOOD PRESSURE: 132 MMHG | HEART RATE: 50 BPM | DIASTOLIC BLOOD PRESSURE: 51 MMHG | TEMPERATURE: 99 F

## 2020-05-20 VITALS — DIASTOLIC BLOOD PRESSURE: 58 MMHG | TEMPERATURE: 97.4 F | HEART RATE: 51 BPM | SYSTOLIC BLOOD PRESSURE: 145 MMHG

## 2020-05-20 VITALS — TEMPERATURE: 98.7 F | DIASTOLIC BLOOD PRESSURE: 57 MMHG | HEART RATE: 54 BPM | SYSTOLIC BLOOD PRESSURE: 148 MMHG

## 2020-05-20 VITALS — DIASTOLIC BLOOD PRESSURE: 57 MMHG | SYSTOLIC BLOOD PRESSURE: 144 MMHG | HEART RATE: 51 BPM | TEMPERATURE: 98.3 F

## 2020-05-20 LAB
HCT VFR BLD AUTO: 38.7 % (ref 42–52)
HGB BLD-MCNC: 13.4 G/DL (ref 13.5–18)
INR BLD: 1.6 (ref 0.8–3)
PROTHROMBIN TIME: 17.5 SECONDS (ref 9.7–12.8)

## 2020-05-20 NOTE — NUR
Patient in bed resting. Alert and oriented x 3. Assessment complete. Denies
pain at this time. Chest tube site with gauze is CDI. Denies further needs a
this time.

## 2020-05-20 NOTE — NUR
Discharge education provided to patient. Educated on all new medications and
medication safety. Educated on signs and symptoms of infection and scheduling
follow up appointment. All questions answered. INT to left and right AC
discontinued, catheter tips intact. Pain medications given prior to discharge
for "rib pain" 7/10. No further needs at this time. Patient out by wheelchair
with surgical staff.

## 2020-05-20 NOTE — NUR
Up to bathroom to void, assist x1 out of bed-no assist back. Did  room
and amblate in room. Performed oral care/hygiene. C/O pain to right
chest/shoulder described as constant ache/throbbing-rating 8/10-chay given per
dr bear. Will monitor.

## 2020-11-03 ENCOUNTER — HOSPITAL ENCOUNTER (OUTPATIENT)
Dept: HOSPITAL 6 - LAB | Age: 70
End: 2020-11-03
Attending: INTERNAL MEDICINE
Payer: MEDICARE

## 2020-11-03 DIAGNOSIS — E29.1: ICD-10-CM

## 2020-11-03 DIAGNOSIS — M1A.9XX0: ICD-10-CM

## 2020-11-03 DIAGNOSIS — K90.9: ICD-10-CM

## 2020-11-03 DIAGNOSIS — R20.2: ICD-10-CM

## 2020-11-03 DIAGNOSIS — I48.91: ICD-10-CM

## 2020-11-03 DIAGNOSIS — I48.92: ICD-10-CM

## 2020-11-03 DIAGNOSIS — Z12.5: Primary | ICD-10-CM

## 2020-11-03 DIAGNOSIS — I10: ICD-10-CM

## 2020-11-03 DIAGNOSIS — Z12.11: ICD-10-CM

## 2020-11-03 LAB
ALBUMIN SERPL-MCNC: 4.4 G/DL (ref 3.4–4.8)
ALT SERPL-CCNC: 18 U/L (ref 0–55)
AST SERPL-CCNC: 24 U/L (ref 5–34)
BASOPHILS # BLD: 0 10*3/UL (ref 0.02–0.1)
BILIRUB SERPL-MCNC: 0.9 MG/DL (ref 0.2–1.2)
CALCIUM SERPL-MCNC: 10.1 MG/DL (ref 8.3–10.5)
CO2 SERPL-SCNC: 19 MMOL/L (ref 23–31)
EOSINOPHIL # BLD: 0.1 10*3/UL (ref 0.04–0.4)
EOSINOPHIL NFR BLD: 1.1 % (ref 0–4)
ERYTHROCYTE [DISTWIDTH] IN BLOOD BY AUTOMATED COUNT: 15.3 % (ref 11.5–14.5)
ERYTHROCYTE [SEDIMENTATION RATE] IN BLOOD: 15 MM/HR (ref 0–20)
GLUCOSE SERPL-MCNC: 98 MG/DL (ref 75–110)
HCT VFR BLD AUTO: 45.9 % (ref 42–52)
HGB BLD-MCNC: 15.3 G/DL (ref 13.5–18)
LYMPHOCYTES # BLD: 1 10*3/UL (ref 1.5–4)
MCH RBC QN AUTO: 30 PG (ref 27–31)
MCHC RBC AUTO-ENTMCNC: 33 G/DL (ref 33–37)
MCV RBC AUTO: 89 FL (ref 78–100)
MONOCYTES # BLD: 0.7 10*3/UL (ref 0.2–0.8)
NEUTROPHILS # BLD: 6.4 10*3/UL (ref 1.4–6.5)
PLATELET # BLD AUTO: 157 K/MM3 (ref 130–400)
PMV BLD AUTO: 11.3 FL (ref 7.4–10.4)
POTASSIUM SERPL-SCNC: 4.6 MMOL/L (ref 3.5–5.1)
PROT SERPL-MCNC: 8.2 G/DL (ref 6.2–8.1)
RBC # BLD AUTO: 5.18 M/MM3 (ref 4.2–5.6)
SODIUM SERPL-SCNC: 136 MMOL/L (ref 136–145)
TESTOST SERPL-MCNC: 521 NG/DL (ref 221–716)
WBC # BLD AUTO: 8.2 K/MM3 (ref 4.8–10.8)

## 2020-11-13 ENCOUNTER — HOSPITAL ENCOUNTER (OUTPATIENT)
Dept: HOSPITAL 6 - LAB | Age: 70
End: 2020-11-13
Attending: INTERNAL MEDICINE
Payer: MEDICARE

## 2020-11-13 DIAGNOSIS — I10: ICD-10-CM

## 2020-11-13 DIAGNOSIS — E78.5: ICD-10-CM

## 2020-11-13 DIAGNOSIS — I48.0: ICD-10-CM

## 2020-11-13 DIAGNOSIS — Z12.11: Primary | ICD-10-CM

## 2020-11-13 DIAGNOSIS — M1A.9XX0: ICD-10-CM

## 2020-11-13 LAB
APPEARANCE UR: CLEAR
BILIRUB UR QL STRIP.AUTO: NEGATIVE
COLOR UR AUTO: YELLOW
GLUCOSE UR QL STRIP.AUTO: NEGATIVE MG/DL
KETONES UR QL STRIP.AUTO: NEGATIVE
LEUKOCYTE ESTERASE UR QL STRIP: NEGATIVE
MUCOUS THREADS URNS QL MICRO: PRESENT
NITRITE UR QL STRIP: NEGATIVE
PH UR STRIP.AUTO: 5 [PH] (ref 5–8)
PROT ?TM UR-MCNC: (no result) MG/DL
RBC UR QL AUTO: NEGATIVE
SP GR UR STRIP.AUTO: 1.01 (ref 1–1.03)
UROBILINOGEN UR-MCNC: NORMAL MG/DL
WBC #/AREA URNS HPF: (no result) /HPF (ref 0–3)

## 2021-05-18 ENCOUNTER — HOSPITAL ENCOUNTER (OUTPATIENT)
Dept: HOSPITAL 6 - LAB | Age: 71
End: 2021-05-18
Attending: INTERNAL MEDICINE
Payer: MEDICARE

## 2021-05-18 DIAGNOSIS — Z01.818: Primary | ICD-10-CM

## 2021-05-18 LAB
ALBUMIN SERPL-MCNC: 4.2 G/DL (ref 3.4–4.8)
ALT SERPL-CCNC: 31 U/L (ref 0–55)
APPEARANCE UR: CLEAR
APTT PPP: 32.6 SECONDS (ref 21–32)
AST SERPL-CCNC: 28 U/L (ref 5–34)
BASOPHILS # BLD: 0.04 10*3/UL (ref 0.02–0.1)
BILIRUB SERPL-MCNC: 0.7 MG/DL (ref 0.2–1.2)
BILIRUB UR QL STRIP.AUTO: NEGATIVE
CALCIUM SERPL-MCNC: 9.8 MG/DL (ref 8.3–10.5)
CO2 SERPL-SCNC: 21 MMOL/L (ref 23–31)
COLOR UR AUTO: YELLOW
EOSINOPHIL # BLD: 0.12 10*3/UL (ref 0.04–0.4)
EOSINOPHIL NFR BLD: 1.5 % (ref 0–4)
ERYTHROCYTE [DISTWIDTH] IN BLOOD BY AUTOMATED COUNT: 13.9 % (ref 11.5–14.5)
GLUCOSE SERPL-MCNC: 87 MG/DL (ref 75–110)
GLUCOSE UR QL STRIP.AUTO: NEGATIVE MG/DL
HCT VFR BLD AUTO: 45.9 % (ref 42–52)
HGB BLD-MCNC: 15.7 G/DL (ref 13.5–18)
KETONES UR QL STRIP.AUTO: NEGATIVE
LEUKOCYTE ESTERASE UR QL STRIP: NEGATIVE
LYMPHOCYTES # BLD: 1.12 10*3/UL (ref 1.5–4)
MAGNESIUM SERPL-MCNC: 2.08 MG/DL (ref 1.6–2.6)
MCH RBC QN AUTO: 30 PG (ref 27–31)
MCHC RBC AUTO-ENTMCNC: 34 G/DL (ref 33–37)
MCV RBC AUTO: 89 FL (ref 78–100)
MONOCYTES # BLD: 0.75 10*3/UL (ref 0.2–0.8)
MUCOUS THREADS URNS QL MICRO: PRESENT
NEUTROPHILS # BLD: 5.89 10*3/UL (ref 1.4–6.5)
NITRITE UR QL STRIP: NEGATIVE
PH UR STRIP.AUTO: 5 [PH] (ref 5–8)
PLATELET # BLD AUTO: 150 K/MM3 (ref 130–400)
PMV BLD AUTO: 10.9 FL (ref 7.4–10.4)
POTASSIUM SERPL-SCNC: 4.9 MMOL/L (ref 3.5–5.1)
PROT ?TM UR-MCNC: NEGATIVE MG/DL
PROT SERPL-MCNC: 7.6 G/DL (ref 6.2–8.1)
PROTHROMBIN TIME: 18.1 SECONDS (ref 9–12)
RBC # BLD AUTO: 5.17 M/MM3 (ref 4.2–5.6)
RBC UR QL AUTO: NEGATIVE
SODIUM SERPL-SCNC: 140 MMOL/L (ref 136–145)
SP GR UR STRIP.AUTO: 1.02 (ref 1–1.03)
UROBILINOGEN UR-MCNC: NORMAL MG/DL
WBC # BLD AUTO: 8 K/MM3 (ref 4.8–10.8)
WBC #/AREA URNS HPF: (no result) /HPF (ref 0–3)

## 2022-03-25 ENCOUNTER — HOSPITAL ENCOUNTER (OUTPATIENT)
Dept: HOSPITAL 19 - SDCO | Age: 72
Discharge: HOME | End: 2022-03-25
Attending: SPECIALIST
Payer: MEDICARE

## 2022-03-25 VITALS — DIASTOLIC BLOOD PRESSURE: 67 MMHG | HEART RATE: 50 BPM | SYSTOLIC BLOOD PRESSURE: 125 MMHG

## 2022-03-25 VITALS — SYSTOLIC BLOOD PRESSURE: 113 MMHG | DIASTOLIC BLOOD PRESSURE: 60 MMHG | HEART RATE: 51 BPM

## 2022-03-25 VITALS — DIASTOLIC BLOOD PRESSURE: 69 MMHG | HEART RATE: 50 BPM | SYSTOLIC BLOOD PRESSURE: 119 MMHG

## 2022-03-25 VITALS — WEIGHT: 233.47 LBS | HEIGHT: 72 IN | BODY MASS INDEX: 31.62 KG/M2

## 2022-03-25 VITALS — DIASTOLIC BLOOD PRESSURE: 68 MMHG | TEMPERATURE: 97.1 F | SYSTOLIC BLOOD PRESSURE: 135 MMHG | HEART RATE: 53 BPM

## 2022-03-25 VITALS — SYSTOLIC BLOOD PRESSURE: 135 MMHG | HEART RATE: 57 BPM | TEMPERATURE: 97.2 F | DIASTOLIC BLOOD PRESSURE: 68 MMHG

## 2022-03-25 DIAGNOSIS — I48.20: ICD-10-CM

## 2022-03-25 DIAGNOSIS — Z87.891: ICD-10-CM

## 2022-03-25 DIAGNOSIS — K92.1: Primary | ICD-10-CM

## 2022-03-25 DIAGNOSIS — C64.1: ICD-10-CM

## 2022-03-25 DIAGNOSIS — Z86.010: ICD-10-CM

## 2022-03-25 DIAGNOSIS — G47.33: ICD-10-CM

## 2022-03-25 DIAGNOSIS — K64.1: ICD-10-CM

## 2022-03-25 DIAGNOSIS — Z95.4: ICD-10-CM

## 2022-03-25 DIAGNOSIS — Z95.0: ICD-10-CM

## 2022-03-25 DIAGNOSIS — Z79.01: ICD-10-CM

## 2022-03-25 NOTE — NUR
PT MET DISCHARGE CRITERIA, VSS. IV REMOVED WITHOUT COMPLICATIONS, CATHETER
INTACT. DISCHARGE INSTRUCTIONS REVIEWED, PT VERBALIZED UNDERSTANDING. PT DC TO
HOME VIA WHEELCHAIR TO FRONT ENTRANCE.